# Patient Record
Sex: FEMALE | Race: WHITE | Employment: UNEMPLOYED | URBAN - METROPOLITAN AREA
[De-identification: names, ages, dates, MRNs, and addresses within clinical notes are randomized per-mention and may not be internally consistent; named-entity substitution may affect disease eponyms.]

---

## 2018-11-06 ENCOUNTER — OFFICE VISIT (OUTPATIENT)
Dept: DERMATOLOGY | Age: 68
End: 2018-11-06
Payer: MEDICARE

## 2018-11-06 DIAGNOSIS — Z12.83 SCREENING EXAM FOR SKIN CANCER: ICD-10-CM

## 2018-11-06 DIAGNOSIS — D48.5 NEOPLASM OF UNCERTAIN BEHAVIOR OF SKIN: ICD-10-CM

## 2018-11-06 DIAGNOSIS — D22.9 MULTIPLE BENIGN NEVI: Primary | ICD-10-CM

## 2018-11-06 DIAGNOSIS — L82.1 SEBORRHEIC KERATOSES: ICD-10-CM

## 2018-11-06 PROCEDURE — 11100 PR BIOPSY OF SKIN LESION: CPT | Performed by: DERMATOLOGY

## 2018-11-06 PROCEDURE — 99203 OFFICE O/P NEW LOW 30 MIN: CPT | Performed by: DERMATOLOGY

## 2018-11-06 NOTE — PATIENT INSTRUCTIONS
Protecting Yourself From the Sun    · Apply broad spectrum water resistant sunscreen with an SPF of at least 30 to exposed areas of the skin. Dont forget the ears and lips! Remember to reapply sunscreen about every 2 hours and after swimming or sweating. · Wear sun protective clothing. Swim shirts (aka. rash guards) are a great idea and negates the need to reapply sunscreen in those areas. · Seek the shade whenever possible especially between the hours of 10 am and 4 pm when the suns rays are the strongest.     · Avoid tanning beds       Biopsy Wound Care Instructions    · Keep the bandage in place for 24 hours. · Cleanse the wound with mild soapy water daily   Gently dry the area.  Apply Vaseline or petroleum jelly to the wound using a cotton tipped applicator.  Cover with a clean bandage.  Repeat this process until the biopsy site is healed.  If you had stitches placed, continue treating the site until the stitches are removed. Remember to make an appointment to return to have your stitches removed by our staff.  You may shower and bathe as usual.       ** Biopsy results generally take around 7 business days to come back. If you have not heard from us by then, please call the office at [phone] between 8AM and 4PM Monday through Friday.

## 2018-11-08 LAB — DERMATOLOGY PATHOLOGY REPORT: NORMAL
